# Patient Record
Sex: FEMALE | Race: WHITE | URBAN - METROPOLITAN AREA
[De-identification: names, ages, dates, MRNs, and addresses within clinical notes are randomized per-mention and may not be internally consistent; named-entity substitution may affect disease eponyms.]

---

## 2019-04-19 ENCOUNTER — OFFICE VISIT (OUTPATIENT)
Dept: URBAN - METROPOLITAN AREA CLINIC 81 | Facility: CLINIC | Age: 49
End: 2019-04-19
Payer: COMMERCIAL

## 2019-04-19 PROCEDURE — 92310 CONTACT LENS FITTING OU: CPT | Performed by: OPTOMETRIST

## 2019-04-19 PROCEDURE — 92014 COMPRE OPH EXAM EST PT 1/>: CPT | Performed by: OPTOMETRIST

## 2019-04-19 PROCEDURE — 92015 DETERMINE REFRACTIVE STATE: CPT | Performed by: OPTOMETRIST

## 2019-04-19 ASSESSMENT — VISUAL ACUITY
OD: 20/20
OS: 20/20

## 2019-04-19 ASSESSMENT — KERATOMETRY
OS: 42.63
OD: 42.38

## 2019-04-19 ASSESSMENT — INTRAOCULAR PRESSURE
OS: 15
OD: 15

## 2019-04-19 NOTE — IMPRESSION/PLAN
Impression: Myopia, bilateral: H52.13. Plan: Glasses update option given. UV protection advised. Potential for initial adaptation. Pt understands. Pt prefers to keep similar CL parameters as currently using, if possible.

## 2020-12-09 ENCOUNTER — OFFICE VISIT (OUTPATIENT)
Dept: URBAN - METROPOLITAN AREA CLINIC 81 | Facility: CLINIC | Age: 50
End: 2020-12-09
Payer: COMMERCIAL

## 2020-12-09 PROCEDURE — 92014 COMPRE OPH EXAM EST PT 1/>: CPT | Performed by: OPTOMETRIST

## 2020-12-09 ASSESSMENT — KERATOMETRY
OS: 42.38
OD: 42.13

## 2020-12-09 ASSESSMENT — INTRAOCULAR PRESSURE
OS: 14
OD: 13

## 2020-12-09 ASSESSMENT — VISUAL ACUITY
OS: 20/20
OD: 20/20

## 2020-12-09 NOTE — IMPRESSION/PLAN
Impression: Myopia, bilateral: H52.13.

 new ctl Rx given Plan: New spec Rx given - Discussed changes and possible adaptation period. 

RTC 1 year/PRN

## 2022-01-05 ENCOUNTER — OFFICE VISIT (OUTPATIENT)
Dept: URBAN - METROPOLITAN AREA CLINIC 81 | Facility: CLINIC | Age: 52
End: 2022-01-05
Payer: COMMERCIAL

## 2022-01-05 DIAGNOSIS — H52.13 MYOPIA, BILATERAL: Primary | ICD-10-CM

## 2022-01-05 PROCEDURE — 92310 CONTACT LENS FITTING OU: CPT | Performed by: OPTOMETRIST

## 2022-01-05 PROCEDURE — 92014 COMPRE OPH EXAM EST PT 1/>: CPT | Performed by: OPTOMETRIST

## 2022-01-05 ASSESSMENT — INTRAOCULAR PRESSURE
OS: 16
OD: 15

## 2022-01-05 ASSESSMENT — KERATOMETRY
OD: 42.13
OS: 42.50

## 2022-01-05 ASSESSMENT — VISUAL ACUITY
OS: 20/20
OD: 20/30

## 2022-01-05 NOTE — IMPRESSION/PLAN
Impression: Other migraine, not intractable, without status migrainosus: G43.809. -ocular migraine
-trigger reflecting light Plan: Discussed diagnosis with patient in detail. Recommend to RTC for baseline VF 30-2. Encouraged to reestablish care with a PCP. Will continue to observe condition and/or symptoms. Patient instructed to call if condition gets worse.

## 2022-01-05 NOTE — IMPRESSION/PLAN
Impression: Myopia, bilateral: H52.13.

-happy with 1 day moist contacts, elects to trial distance only OS dominate eye Plan: Dispensed Rx for glasses and contacts to patient. RTC in 1 year.

## 2022-03-02 ENCOUNTER — OFFICE VISIT (OUTPATIENT)
Dept: URBAN - METROPOLITAN AREA CLINIC 81 | Facility: CLINIC | Age: 52
End: 2022-03-02
Payer: COMMERCIAL

## 2022-03-02 DIAGNOSIS — G43.809 OTHER MIGRAINE, NOT INTRACTABLE, WITHOUT STATUS MIGRAINOSUS: Primary | ICD-10-CM

## 2022-03-02 DIAGNOSIS — H25.13 AGE-RELATED NUCLEAR CATARACT, BILATERAL: ICD-10-CM

## 2022-03-02 PROCEDURE — 92083 EXTENDED VISUAL FIELD XM: CPT | Performed by: OPTOMETRIST

## 2022-03-02 PROCEDURE — 99212 OFFICE O/P EST SF 10 MIN: CPT | Performed by: OPTOMETRIST

## 2022-03-02 ASSESSMENT — INTRAOCULAR PRESSURE
OD: 18
OS: 18

## 2022-03-02 NOTE — IMPRESSION/PLAN
Impression: Other migraine, not intractable, without status migrainosus: G43.809. -ocular migraine
-03/02/0222 order baseline VF 30-2, reviewed today, full OU, no neurological defects
-trigger reflecting light, indoor lighting, reflecting sunlight
-duration usually 30-45min Plan: Discussed diagnosis with patient in detail. Reviewed and discussed VF results, full OU. Discussed recommendation with FL-41 qamar tint in glasses. Encouraged to reestablish care with a PCP. Will continue to observe condition and/or symptoms. Patient instructed to call if condition gets worse.

## 2023-01-11 ENCOUNTER — OFFICE VISIT (OUTPATIENT)
Dept: URBAN - METROPOLITAN AREA CLINIC 76 | Facility: CLINIC | Age: 53
End: 2023-01-11
Payer: COMMERCIAL

## 2023-01-11 DIAGNOSIS — H52.13 MYOPIA, BILATERAL: Primary | ICD-10-CM

## 2023-01-11 PROCEDURE — 92310 CONTACT LENS FITTING OU: CPT | Performed by: OPTOMETRIST

## 2023-01-11 PROCEDURE — 92014 COMPRE OPH EXAM EST PT 1/>: CPT | Performed by: OPTOMETRIST

## 2023-01-11 ASSESSMENT — VISUAL ACUITY
OD: 20/20
OS: 20/20

## 2023-01-11 ASSESSMENT — KERATOMETRY
OS: 42.75
OD: 42.38

## 2023-01-11 ASSESSMENT — INTRAOCULAR PRESSURE
OD: 16
OS: 17

## 2023-01-11 NOTE — IMPRESSION/PLAN
Impression: Myopia, bilateral: H52.13. Plan: Discussed diagnosis with patient. Dispensed Rx for glasses and contacts to patient. Order trial contacts, *ok to order if trials on back order.